# Patient Record
Sex: MALE | Race: WHITE | Employment: OTHER | URBAN - METROPOLITAN AREA
[De-identification: names, ages, dates, MRNs, and addresses within clinical notes are randomized per-mention and may not be internally consistent; named-entity substitution may affect disease eponyms.]

---

## 2019-05-24 ENCOUNTER — TRANSFERRED RECORDS (OUTPATIENT)
Dept: HEALTH INFORMATION MANAGEMENT | Facility: CLINIC | Age: 49
End: 2019-05-24

## 2019-05-25 LAB — EJECTION FRACTION: 55 %

## 2019-07-26 ENCOUNTER — TRANSFERRED RECORDS (OUTPATIENT)
Dept: HEALTH INFORMATION MANAGEMENT | Facility: CLINIC | Age: 49
End: 2019-07-26

## 2019-08-07 ENCOUNTER — TRANSFERRED RECORDS (OUTPATIENT)
Dept: HEALTH INFORMATION MANAGEMENT | Facility: CLINIC | Age: 49
End: 2019-08-07

## 2019-08-07 LAB — EJECTION FRACTION: 60

## 2019-09-11 ENCOUNTER — TRANSFERRED RECORDS (OUTPATIENT)
Dept: HEALTH INFORMATION MANAGEMENT | Facility: CLINIC | Age: 49
End: 2019-09-11

## 2019-09-11 LAB — PHQ9 SCORE: 3

## 2019-11-01 ENCOUNTER — TRANSFERRED RECORDS (OUTPATIENT)
Dept: HEALTH INFORMATION MANAGEMENT | Facility: CLINIC | Age: 49
End: 2019-11-01

## 2019-11-04 ENCOUNTER — TRANSFERRED RECORDS (OUTPATIENT)
Dept: HEALTH INFORMATION MANAGEMENT | Facility: CLINIC | Age: 49
End: 2019-11-04

## 2019-11-05 ENCOUNTER — MEDICAL CORRESPONDENCE (OUTPATIENT)
Dept: HEALTH INFORMATION MANAGEMENT | Facility: CLINIC | Age: 49
End: 2019-11-05

## 2019-11-06 ENCOUNTER — TELEPHONE (OUTPATIENT)
Dept: ONCOLOGY | Facility: CLINIC | Age: 49
End: 2019-11-06

## 2019-11-06 NOTE — TELEPHONE ENCOUNTER
I received a call from Dany's wife Cristal who was extremely upset with the care her  is receiving at the [facility]. Dany was recently diagnosed with [facility]. Cristal stated that after leaving several messages over the last week requesting a call from anyone on Dany's care team there, they still have not received a return call. Cristal & Dany are desperate for answers & [facility] not [facility]. I sent an inTryLifeet message to [address] [employer] asking if there is any way that Dr Rosales, Dr Agee or Dr Rocha would be able to see Dany before 12/16/19 (first NP appt available) I told Cristal I would call her before the end of the day with status of request.

## 2019-11-06 NOTE — TELEPHONE ENCOUNTER
ONCOLOGY INTAKE: Records Information      APPT INFORMATION: 12/16/19 - Connie - [facility]  Referring provider:  self referred  Referring provider s clinic:  none  Reason for visit/diagnosis:  colon cancer  Has patient been notified of appointment date and time?: yes    RECORDS INFORMATION:  Were the records received with the referral (via [employer])? No    Has patient been seen for any external appt for this diagnosis? Yes    If yes, where? [facility]    Has patient had any imaging or procedures outside of [place] for this condition? Yes      If Yes, where? [facility]    ADDITIONAL INFORMATION:  Please request records/imaging/path urgently - pt added to waitlist as high priority & inbasket sent to [address] [facility] requesting a sooner appt  Scheduled via call from pt's wife Cristal (see telephone encounter)

## 2019-11-06 NOTE — TELEPHONE ENCOUNTER
Rescheduled from 12/16/19 with Dr Rosales to 11/25/19 with Dr Agee per sooner appt available from cancellation.

## 2019-11-06 NOTE — TELEPHONE ENCOUNTER
I called Cristal to follow-up with a status update re: a sooner appt. I told her that I have not heard back yet, but should hopefully have an answer sometime tomorrow & will call her as soon as I hear back. She was very appreciative for the follow-up call.

## 2019-11-07 NOTE — TELEPHONE ENCOUNTER
Rescheduled from 11/25/19 with Dr Agee at the [facility] to 11/12/19 with Dr Smith at  [facility]    ONCOLOGY INTAKE: Records Information        APPT INFORMATION: 11/12/19 - Luis JOHN  Referring provider:  self referred  Referring provider s clinic:  none  Reason for visit/diagnosis:  rectal vs colon cancer  Has patient been notified of appointment date and time?: yes     RECORDS INFORMATION:  Were the records received with the referral (via [employer])? No     Has patient been seen for any external appt for this diagnosis? Yes     If yes, where? [facility]     Has patient had any imaging or procedures outside of [place] for this condition? Yes                                If Yes, where? [facility]    ADDITIONAL INFORMATION:  Please request records/imaging/path urgently - pt added to waitlist as high priority & inteodoroet sent to [address] leaders requesting a sooner appt   Scheduled via call from pt's wife Cristal (see telephone encounter)

## 2019-11-08 ENCOUNTER — TRANSFERRED RECORDS (OUTPATIENT)
Dept: HEALTH INFORMATION MANAGEMENT | Facility: CLINIC | Age: 49
End: 2019-11-08

## 2019-11-08 NOTE — TELEPHONE ENCOUNTER
RECORDS STATUS - ALL OTHER DIAGNOSIS      RECORDS RECEIVED FROM: Epic/CE  [facility]   DATE RECEIVED: 11/12/2019    NOTES STATUS DETAILS   OFFICE NOTE from referring provider N/A self referred   OFFICE NOTE from medical oncologist     DISCHARGE SUMMARY from hospital     DISCHARGE REPORT from the ER     OPERATIVE REPORT     MEDICATION LIST     CLINICAL TRIAL TREATMENTS TO DATE     LABS     PATHOLOGY REPORTS     ANYTHING RELATED TO DIAGNOSIS     GENONOMIC TESTING     TYPE:     IMAGING (NEED IMAGES & REPORT)     CT SCANS     MRI     MAMMO     ULTRASOUND Complete  PACS   PET       Action    Action Taken 11/8/2019 11:41pm     I called pt Dany and spoke to his wife. Dany signed a release form at the [facility] yesterday.     * [facility]:   - Imaging Dept [employer] [phone] option 4   I called and sent a formal fax request to Fax: [phone]     [facility] Medical Records Dept: You may call us at [phone]. Our fax number is [phone]. I [facility]ed a [facility] all materials [facility].     I tried calling the [facility] Pathology Dept and sent a fax request over the path dept.     11/11/2019 11am   I tried calling the [facility] path [facility] but the phone went to .     I tried calling Med Rec - phone went to .     [facility].     11:26pm  I faxed a second request to: [phone] Ph: [phone]   MR Dept is closed today but the records will be faxed over today.     3:40pm   I called the [facility] Ph: [phone]   The [facility] just faxed over the records- I'll wait for them to come through    3:58pm I checked the faxes but no records from the [facility] have come through the fax system yet.     4:08pm   [facility] to let them know we haven't received a fax yet. They asked if I send the fax again to [phone]. I sent over another request for materials.     I also sent an ib-message to Dr. Smith's nurse Kerrie Vigil updating her on the situation.     I called Kerrie Vigil and  asked if she could still keep the appt for tomorrow- I mentioned that the records have been faxed to Dr. Smith and her email account.

## 2019-11-11 ENCOUNTER — TRANSFERRED RECORDS (OUTPATIENT)
Dept: HEALTH INFORMATION MANAGEMENT | Facility: CLINIC | Age: 49
End: 2019-11-11

## 2019-11-11 ENCOUNTER — PATIENT OUTREACH (OUTPATIENT)
Dept: ONCOLOGY | Facility: CLINIC | Age: 49
End: 2019-11-11

## 2019-11-11 NOTE — PROGRESS NOTES
Called patient who goes by Willie, spoke to his wife Cristal. Stated that no records have been received from the [facility]. Cristal stated that she prefers to delay appointment with Dr. Smith since the [facility] has sent no records. Writer will follow up. Kerrie Vigil RN,BSN,OCN

## 2019-11-12 ENCOUNTER — PRE VISIT (OUTPATIENT)
Dept: ONCOLOGY | Facility: CLINIC | Age: 49
End: 2019-11-12

## 2019-11-13 ENCOUNTER — TRANSFERRED RECORDS (OUTPATIENT)
Dept: HEALTH INFORMATION MANAGEMENT | Facility: CLINIC | Age: 49
End: 2019-11-13

## 2019-11-14 ENCOUNTER — TELEPHONE (OUTPATIENT)
Dept: ONCOLOGY | Facility: CLINIC | Age: 49
End: 2019-11-14

## 2019-11-14 NOTE — TELEPHONE ENCOUNTER
Called  back to inform her that Dany was not seen in clinic secondary to not having any records from the [facility]. Dany's wife stated that he was scheduled at [facility] and will call clinic if she would like another opinion from Dr. Smith. Kerrie Vigil RN,BSN,OCN

## 2019-11-14 NOTE — TELEPHONE ENCOUNTER
Susan is calling to offer any assistance to Kerrie(Dr. Smith's nurse ) for any scheduling needs. She is a  who is assigned to [facility]

## 2019-11-19 ENCOUNTER — TRANSFERRED RECORDS (OUTPATIENT)
Dept: HEALTH INFORMATION MANAGEMENT | Facility: CLINIC | Age: 49
End: 2019-11-19

## 2019-12-02 ENCOUNTER — RECORDS - HEALTHEAST (OUTPATIENT)
Dept: ADMINISTRATIVE | Facility: OTHER | Age: 49
End: 2019-12-02

## 2019-12-16 ENCOUNTER — PRE VISIT (OUTPATIENT)
Dept: ONCOLOGY | Facility: CLINIC | Age: 49
End: 2019-12-16